# Patient Record
Sex: MALE | URBAN - METROPOLITAN AREA
[De-identification: names, ages, dates, MRNs, and addresses within clinical notes are randomized per-mention and may not be internally consistent; named-entity substitution may affect disease eponyms.]

---

## 2023-01-25 ENCOUNTER — OFFICE VISIT (OUTPATIENT)
Dept: URBAN - METROPOLITAN AREA CLINIC 31 | Facility: CLINIC | Age: 53
End: 2023-01-25

## 2023-01-25 NOTE — HPI-COLORECTAL CANCER SCREENING
Patient presents today for a colorectal cancer screening. He currently admits bowel movements per day with normal and formed stools. Patient denies seeing any blood or mucous in the stool. Patient denies melena. Patient admits/denies heartburn. Patient states this is his first colonoscopy. There is no history of bleeding disorders or respiratory diseases. Patient has never had any previous complications with anesthesia. There is no history of spinal cord injuries.